# Patient Record
Sex: MALE | Race: WHITE | Employment: OTHER | URBAN - METROPOLITAN AREA
[De-identification: names, ages, dates, MRNs, and addresses within clinical notes are randomized per-mention and may not be internally consistent; named-entity substitution may affect disease eponyms.]

---

## 2018-02-19 ENCOUNTER — HOSPITAL ENCOUNTER (OUTPATIENT)
Dept: GENERAL RADIOLOGY | Age: 68
Discharge: HOME | End: 2018-02-19
Attending: FAMILY MEDICINE
Payer: MEDICARE

## 2018-02-19 ENCOUNTER — HOSPITAL ENCOUNTER (OUTPATIENT)
Dept: NON INVASIVE DIAGNOSTICS | Age: 68
Discharge: HOME | End: 2018-02-19
Attending: FAMILY MEDICINE
Payer: MEDICARE

## 2018-02-19 DIAGNOSIS — R06.00 PND (PAROXYSMAL NOCTURNAL DYSPNEA): ICD-10-CM

## 2018-02-19 DIAGNOSIS — R06.02 SHORTNESS OF BREATH: ICD-10-CM

## 2018-02-19 DIAGNOSIS — R06.00 DYSPNEA: ICD-10-CM

## 2018-02-19 PROCEDURE — 71046 X-RAY EXAM CHEST 2 VIEWS: CPT

## 2018-02-19 PROCEDURE — 93306 TTE W/DOPPLER COMPLETE: CPT

## 2018-03-14 ENCOUNTER — OFFICE VISIT (OUTPATIENT)
Dept: CARDIOLOGY CLINIC | Age: 68
End: 2018-03-14

## 2018-03-14 VITALS
DIASTOLIC BLOOD PRESSURE: 70 MMHG | BODY MASS INDEX: 28.81 KG/M2 | SYSTOLIC BLOOD PRESSURE: 138 MMHG | HEIGHT: 69 IN | HEART RATE: 72 BPM | WEIGHT: 194.5 LBS

## 2018-03-14 DIAGNOSIS — R07.9 CHEST PAIN, UNSPECIFIED TYPE: Primary | ICD-10-CM

## 2018-03-14 DIAGNOSIS — R06.02 SOB (SHORTNESS OF BREATH): ICD-10-CM

## 2018-03-14 DIAGNOSIS — I35.0 NONRHEUMATIC AORTIC VALVE STENOSIS: ICD-10-CM

## 2018-03-14 RX ORDER — FUROSEMIDE 40 MG/1
TABLET ORAL DAILY
COMMUNITY
End: 2018-05-17 | Stop reason: SDUPTHER

## 2018-03-14 NOTE — PROGRESS NOTES
HISTORY OF PRESENT ILLNESS  Daniel Cedillo is a 70 y.o. male     SUMMARY:   Problem List  Date Reviewed: 3/14/2018          Codes Class Noted    Chest pain ICD-10-CM: R07.9  ICD-9-CM: 786.50  3/14/2018        SOB (shortness of breath) ICD-10-CM: R06.02  ICD-9-CM: 786.05  3/14/2018        Nonrheumatic aortic valve stenosis ICD-10-CM: I35.0  ICD-9-CM: 424.1  3/14/2018    Overview Signed 3/14/2018 12:56 PM by Peter Bruner MD     2/18 echo normal lvef, mild as peak gradient 45mm mean 23 varun 1.3cm2. Mild ai                   No current outpatient prescriptions on file prior to visit. No current facility-administered medications on file prior to visit. CARDIOLOGY STUDIES TO DATE:  2/18 echo normal lvef, mild as peak gradient 45mm mean 23 varun 1.3cm2. Mild ai        Chief Complaint   Patient presents with    Aortic Stenosis     HPI :  Mr. Manolo Chen is a 70year-old referred by Dr. Terri Qureshi for cardiac evaluation. For years, he has slept in a recliner because he cannot lie flat and get to sleep. It is very difficult to tell from talking to him whether this is an issue with his nasal airways, mouth breathing or it is true orthopnea, but at any rate, it has gotten much worse over the last couple of months. He saw Dr. Terri Qureshi about this and we have reviewed those records. He had a normal EKG and normal lab work, except for an elevated glucose and a negative chest x-ray. He had an echocardiogram, the results of which are summarized above. He brought in a bunch of records from home and he has a finger oximeter and he has noted both low heart rate and low oxygen levels after he lies down for a while and it seems to correlate with his symptoms. He has occasional episodes when he is sitting for too long and he will get short of breath, but he really does not have any exertional symptoms.   He was recently started on Lasix and he thinks that has helped in that he can get a couple of hours of sleep a night now, even though he takes the Lasix at night and he does not have excessive urination from the Lasix nor does he have any dependent edema at the end of the day. CARDIAC ROS:   negative for chest pain, palpitations, syncope, exertional chest pressure/discomfort, claudication    Family History   Problem Relation Age of Onset    Heart Disease Mother      chf       History reviewed. No pertinent past medical history. GENERAL ROS:  A comprehensive review of systems was negative except for: Gastrointestinal: positive for dysphagia    Visit Vitals    /70    Pulse 72    Ht 5' 9\" (1.753 m)    Wt 194 lb 8 oz (88.2 kg)    BMI 28.72 kg/m2       Wt Readings from Last 3 Encounters:   03/14/18 194 lb 8 oz (88.2 kg)            BP Readings from Last 3 Encounters:   03/14/18 138/70       PHYSICAL EXAM  General appearance: alert, cooperative, no distress, appears stated age  Neurologic: Alert and oriented X 3  Neck: supple, symmetrical, trachea midline, no adenopathy, no carotid bruit and no JVD  Lungs: clear to auscultation bilaterally  Heart: regular rate and rhythm, S1, S2 normal, no S3 or S4, systolic murmur: early systolic 2/6, crescendo at 2nd right intercostal space  Abdomen: soft, non-tender. Bowel sounds normal. No masses,  no organomegaly  Extremities: extremities normal, atraumatic, no cyanosis or edema  Pulses: 2+ and symmetric      ASSESSMENT  Mr. Denny's symptoms are very intriguing. They are not related to his aortic stenosis. He is wondering about diastolic dysfunction. That is a slight possibility, but I do not think that is very likely. I wonder about some sort of positional hypoxia syndrome with intracardiac shunting and I am not sure how to factor in the bradycardia that he has recorded on his oximeter.   I do think he needs a complete pulmonary evaluation, including some sort of sleep evaluation because it sounds like he has something in that area as well, but I cannot put my finger on it.  We are going to do a Holter monitor on him to see what his heart rate does when he is having symptoms and I also think we will repeat his echocardiogram and do a bubble study to see if we can detect any shunting. He may ultimately need a CTA of the chest and great vessels to further sort this out. current treatment plan is effective, no change in therapy  lab results and schedule of future lab studies reviewed with patient  reviewed diet, exercise and weight control    Encounter Diagnoses   Name Primary?  Chest pain, unspecified type Yes    Nonrheumatic aortic valve stenosis     SOB (shortness of breath)      Orders Placed This Encounter    AMB POC EKG ROUTINE W/ 12 LEADS, INTER & REP    furosemide (LASIX) 40 mg tablet       Follow-up Disposition:  Return in about 4 weeks (around 4/11/2018).     Karime Lopez MD  3/14/2018

## 2018-03-14 NOTE — MR AVS SNAPSHOT
[address] [facility] [address] [place] 
[phone] Patient: Lorena Zee MRN: [id number] JJC:8[id number] Visit Information Date & Time Provider Department Dept. Phone Encounter #  
 3/14/2018  2:40 PM Mayelin Espinoza MD [facility]ie Metzger [phone] [id number] Follow-up Instructions Return in about 4 weeks (around 4/11/2018). Your Appointments 3/21/2018 10:30 AM  
PROCEDURE with HOLTER, [facility] ([facility] SCHEDULING) Appt Note: 48 hr holter per Dr. Gaviota Galvan, chest discomfort  
 [address] [facility] [address] [place] [address]  
[address] [facility] [place]  
  
    
 3/30/2018  2:20 PM  
ESTABLISHED PATIENT with Mayelin Esipnoza MD  
[facility] ([facility]) Appt Note: f/u per Dr. Chay Avila [address] [place]  
[address] [facility],[address] [phone] Upcoming Health Maintenance Date Due Hepatitis C Screening 1950 DTaP/Tdap/Td series (1 - Tdap) 2/20/1970 FOBT Q 1 YEAR AGE 50-75 2/20/1999 ZOSTER VACCINE AGE 60> 12/20/2008 GLAUCOMA SCREENING Q2Y 2/20/2014 Pneumococcal 65+ Low/Medium Risk (1 of 2 - PCV13) 2/20/2014 MEDICARE YEARLY EXAM 2/20/2014 Influenza Age 5 to Adult 8/1/2017 Allergies as of 3/14/2018  Review Complete On: 3/14/2018 By: Mayelin Espinoza MD  
 No Known Allergies Current Immunizations  Never Reviewed No immunizations on file. Not reviewed this visit You Were Diagnosed With   
  
 Codes Comments Chest pain, unspecified type    -  Primary ICD-10-CM: R07.9 ICD-9-CM: 786.50 Nonrheumatic aortic valve stenosis     ICD-10-CM: I35.0 ICD-9-CM: 424.1 SOB (shortness of breath)     ICD-10-CM: R06.02 
ICD-9-CM: 786.05 Vitals BP Pulse Height(growth percentile) Weight(growth percentile) BMI Smoking Status 138/70 72 5' 9\" (1.753 m) 194 lb 8 oz (88.2 kg) 28.72 kg/m2 Never Smoker Vitals History BMI and BSA Data Body Mass Index Body Surface Area 28.72 kg/m 2 2.07 m 2 Your Updated Medication List  
  
   
This list is accurate as of 3/14/18  4:05 PM.  Always use your most recent med list.  
  
  
  
  
 LASIX 40 mg tablet Generic drug:  furosemide Take  by mouth daily. We Performed the Following AMB POC EKG ROUTINE W/ 12 LEADS, INTER & REP [12427 CPT(R)] Follow-up Instructions Return in about 4 weeks (around 4/11/2018). Introducing [facility]! Mily Campbell introduces Tracked.com patient portal. Now you can access parts of your medical record, email your doctor's office, and request medication refills online. 1. In your internet browser, go to [email or web address] 2. Click on the First Time User? Click Here link in the Sign In box. You will see the New Member Sign Up page. 3. Enter your [employer]com Access Code exactly as it appears below. You will not need to use this code after youve completed the sign-up process. If you do not sign up before the expiration date, you must request a new code. · Tr[employer] Access Code: [email or web address] Expires: 5/20/2018  3:15 PM 
 
4. Enter the last four digits of your Social Security Number (xxxx) and Date of Birth (mm/dd/yyyy) as indicated and click Submit. You will be taken to the next sign-up page. 5. Create a Bet[employer]knowt ID. This will be your Tracked.com login ID and cannot be changed, so think of one that is secure and easy to remember. 6. Create a Tracked.com password. You can change your password at any time. 7. Enter your Password Reset Question and Answer. This can be used at a later time if you forget your password. 8. Enter your e-mail address.  You will receive e-mail notification when new information is available in [employer]. 9. Click Sign Up. You can now view and download portions of your medical record. 10. Click the Download Summary menu link to download a portable copy of your medical information. If you have questions, please visit the Frequently Asked Questions section of the [employer] website. Remember, [employer] is NOT to be used for urgent needs. For medical emergencies, dial 911. Now available from your iPhone and Android! Please provide this summary of care documentation to your next provider. Your primary care clinician is listed as Marcos Millard. If you have any questions after today's visit, please call [phone].

## 2018-03-20 ENCOUNTER — TELEPHONE (OUTPATIENT)
Dept: CARDIOLOGY CLINIC | Age: 68
End: 2018-03-20

## 2018-03-20 DIAGNOSIS — R06.02 SOB (SHORTNESS OF BREATH): Primary | ICD-10-CM

## 2018-03-20 NOTE — TELEPHONE ENCOUNTER
Called again. I left another message on voicemail stating I was calling to see if he might be able to come in tomorrow at 8 am for other testing. I asked he call back either way.

## 2018-03-20 NOTE — TELEPHONE ENCOUNTER
Patient needs an echo with bubble study, dx hypoxemia, per Dr. Candelaria Rivas. I went ahead and put patient on the schedule for an echo with bubbles for tomorrow at 8 am. He was scheduled to get a 48 hour holter at 10:30 am tomorrow, but Lazarus Plan will put his holter on after the echo if patient is okay with date/time tomorrow.

## 2018-03-20 NOTE — TELEPHONE ENCOUNTER
Called patient. Left message on voicemail requesting call back. I will see if patient is okay with coming in tomorrow at 8. If he is not, we can always reschedule his holter appointment to a day he can get the echo also. Or he can keep the holter appointment at 10:30 tomorrow and get the echo when he drops off the holter if there is a date/time that works.

## 2018-03-20 NOTE — TELEPHONE ENCOUNTER
Patient called back. Verified patient's identity with two identifiers. I told patient Dr. Joe Forte wants him to have another echo that gives a better picture of his heart flow. I told him I could r/s Patient is worried about possible winter weather tomorrow and wanted to r/s holter anyway. Rescheduled both appointments for Friday at 3pm. Patient will arrive at 2:40pm. Patient verbalizes understanding and denies further questions or concerns.

## 2018-03-20 NOTE — TELEPHONE ENCOUNTER
Abner Cee MD   You 1 hour ago (1:46 PM)                 Great. Please let him know what we are proposing.  thanks

## 2018-03-23 ENCOUNTER — CLINICAL SUPPORT (OUTPATIENT)
Dept: CARDIOLOGY CLINIC | Age: 68
End: 2018-03-23

## 2018-03-23 DIAGNOSIS — R07.9 CHEST PAIN, UNSPECIFIED TYPE: ICD-10-CM

## 2018-03-23 DIAGNOSIS — R06.02 SOB (SHORTNESS OF BREATH): ICD-10-CM

## 2018-03-23 DIAGNOSIS — R06.02 SOB (SHORTNESS OF BREATH): Primary | ICD-10-CM

## 2018-03-23 DIAGNOSIS — I35.0 NONRHEUMATIC AORTIC VALVE STENOSIS: ICD-10-CM

## 2018-03-23 NOTE — PROGRESS NOTES
Pt came in for bubble study per Dr. Tony Terry. IV started rt ac #22 and flushed without difficulty. Aggitated saline injected x3 from multiple views. IV removed and pressure applied until homeostasis achieved. Pt advised to leave pressure bandage on for at least 1 hour. Pt verbalized understanding and voiced no complaints.

## 2018-03-26 ENCOUNTER — HOSPITAL ENCOUNTER (OUTPATIENT)
Dept: PULMONOLOGY | Age: 68
Discharge: HOME | End: 2018-03-26
Attending: FAMILY MEDICINE
Payer: MEDICARE

## 2018-03-26 DIAGNOSIS — R06.02 SHORTNESS OF BREATH: ICD-10-CM

## 2018-03-26 PROCEDURE — 94375 RESPIRATORY FLOW VOLUME LOOP: CPT

## 2018-03-26 RX ORDER — ALBUTEROL SULFATE 0.83 MG/ML
2.5 SOLUTION RESPIRATORY (INHALATION)
Status: DISCONTINUED | OUTPATIENT
Start: 2018-03-26 | End: 2018-03-26

## 2018-03-29 ENCOUNTER — OFFICE VISIT (OUTPATIENT)
Dept: CARDIOLOGY CLINIC | Age: 68
End: 2018-03-29

## 2018-03-29 VITALS
DIASTOLIC BLOOD PRESSURE: 78 MMHG | RESPIRATION RATE: 20 BRPM | SYSTOLIC BLOOD PRESSURE: 138 MMHG | HEIGHT: 69 IN | WEIGHT: 197 LBS | HEART RATE: 68 BPM | BODY MASS INDEX: 29.18 KG/M2 | OXYGEN SATURATION: 99 %

## 2018-03-29 DIAGNOSIS — I35.0 NONRHEUMATIC AORTIC VALVE STENOSIS: ICD-10-CM

## 2018-03-29 DIAGNOSIS — R06.00 PND (PAROXYSMAL NOCTURNAL DYSPNEA): ICD-10-CM

## 2018-03-29 DIAGNOSIS — R06.02 SOB (SHORTNESS OF BREATH): Primary | ICD-10-CM

## 2018-03-29 NOTE — PROGRESS NOTES
HISTORY OF PRESENT ILLNESS  Lorena Zee is a 70 y.o. male     SUMMARY:   Problem List  Date Reviewed: 3/14/2018          Codes Class Noted    Chest pain ICD-10-CM: R07.9  ICD-9-CM: 786.50  3/14/2018        SOB (shortness of breath) ICD-10-CM: R06.02  ICD-9-CM: 786.05  3/14/2018        Nonrheumatic aortic valve stenosis ICD-10-CM: I35.0  ICD-9-CM: 424.1  3/14/2018    Overview Signed 3/14/2018 12:56 PM by Mayelin Espinoza MD     2/18 echo normal lvef, mild as peak gradient 45mm mean 23 varun 1.3cm2. Mild ai                   Current Outpatient Prescriptions on File Prior to Visit   Medication Sig    furosemide (LASIX) 40 mg tablet Take  by mouth daily. No current facility-administered medications on file prior to visit. CARDIOLOGY STUDIES TO DATE:  3/18 holter hr  bpm. occassional pvcs and pacs, episodes sob correlate with sinus rhythm  2/18 echo normal lvef, mild as peak gradient 45mm mean 23 varun 1.3cm2. Mild ai  3/18 limited echo with pos bubble study small amount right to left shunting with cough consistent with pfo      Chief Complaint   Patient presents with    Shortness of Breath     HPI :  Mr. Maci Grijalva has had PFTs since we last met, which were normal by his report and he has an appointment with pulmonary sleep doctor next week. He brought in more monitoring results from home and during the time he had the Holter on, he did have some oxygen saturations, which dropped down into the high to mid 80s when he was reclined at 45 degrees on his right or left side. At other times during these positions, his saturations would be normal.  He has noticed that at times when he has felt short of breath, his saturations have been normal.  He is still taking 20 - 40 mg a day of Lasix, which seems to help with his very unorthodox sleep cycle. He is still complaining of a lot of sinus issues.   We went over his Holter report, which clearly shows that he does not have bradycardia that would benefit from any sort of pacemaker and we reviewed his bubble study, which showed a tiny PFO that was noted after cough. CARDIAC ROS:   negative for chest pain, palpitations, syncope, orthopnea, exertional chest pressure/discomfort, claudication, lower extremity edema    Family History   Problem Relation Age of Onset    Heart Disease Mother      chf       History reviewed. No pertinent past medical history. GENERAL ROS:  A comprehensive review of systems was negative except for that written in the HPI. Visit Vitals    /78    Pulse 68    Resp 20    Ht 5' 9\" (1.753 m)    Wt 197 lb (89.4 kg)    SpO2 99%    BMI 29.09 kg/m2       Wt Readings from Last 3 Encounters:   03/29/18 197 lb (89.4 kg)   03/14/18 194 lb 8 oz (88.2 kg)            BP Readings from Last 3 Encounters:   03/29/18 138/78   03/14/18 138/70       PHYSICAL EXAM  General appearance: alert, cooperative, no distress, appears stated age  Neck: supple, symmetrical, trachea midline, no adenopathy, no carotid bruit and no JVD  Lungs: clear to auscultation bilaterally  Heart: regular rate and rhythm, S1, S2 normal, no S3 or S4, systolic murmur: early systolic 2/6, crescendo at 2nd right intercostal space  Extremities: extremities normal, atraumatic, no cyanosis or edema      ASSESSMENT  It is still a puzzle why he has these positional problems and the more I talk to him, the more it seems that this has actually been going on for quite some time, though much worse in the last few months. I do not think his aortic stenosis would cause these type of symptoms and I do not think his tiny PFO would cause these kind of symptoms, but I am wondering if he does have some other kind of right to left shunt and so perhaps a CTA or an MRI could help sort that out, so I am going to talk to Dr. Leticia Mcnally about his suitability for MRI. He is going to follow through with his sleep pulmonary evaluation and we will go from there.           current treatment plan is effective, no change in therapy  lab results and schedule of future lab studies reviewed with patient  reviewed diet, exercise and weight control    Encounter Diagnoses   Name Primary?  SOB (shortness of breath) Yes    Nonrheumatic aortic valve stenosis     PND (paroxysmal nocturnal dyspnea)      No orders of the defined types were placed in this encounter. Follow-up Disposition:  Return in about 4 weeks (around 4/26/2018).     Mayelin Espinoza MD  3/29/2018

## 2018-03-29 NOTE — MR AVS SNAPSHOT
[address] [facility] [address] [facility] 
[phone] Patient: Michael Villalobos MRN: [id number] ODW:0/68/5256 Visit Information Date & Time Provider Department Dept. Phone Encounter #  
 3/29/2018  2:40 PM Brian Camacho MD [facility] [place] [phone] [id number] Follow-up Instructions Return in about 4 weeks (around 4/26/2018). Your Appointments 4/2/2018  8:40 AM  
New Patient with Lowell Beckham MD  
[address] ([facility]) Appt Note: NP; ref Dr Yanelis Mercado; not able to sleep; noct hypoxemia; when recline to sleep, lungs fills upMedicare; NP; ref Dr Yanelis Mercado; not able to sleep; noct hypoxemia; when recline to sleep, lungs fills upMedicare; No PA required; .  
 [address] [place] [phone] [address] [facility] [phone] Upcoming Health Maintenance Date Due Hepatitis C Screening 1950 DTaP/Tdap/Td series (1 - Tdap) 2/20/1970 FOBT Q 1 YEAR AGE 50-75 2/20/1999 ZOSTER VACCINE AGE 60> 12/20/2008 GLAUCOMA SCREENING Q2Y 2/20/2014 Pneumococcal 65+ Low/Medium Risk (1 of 2 - PCV13) 2/20/2014 Influenza Age 5 to Adult 8/1/2017 MEDICARE YEARLY EXAM 3/14/2018 Allergies as of 3/29/2018  Review Complete On: 3/29/2018 By: Brian Camacho MD  
 No Known Allergies Current Immunizations  Never Reviewed No immunizations on file. Not reviewed this visit You Were Diagnosed With   
  
 Codes Comments SOB (shortness of breath)    -  Primary ICD-10-CM: R06.02 
ICD-9-CM: 786.05 Nonrheumatic aortic valve stenosis     ICD-10-CM: I35.0 ICD-9-CM: 424.1 PND (paroxysmal nocturnal dyspnea)     ICD-10-CM: R06.00 
ICD-9-CM: 786.09 Vitals BP Pulse Resp Height(growth percentile) Weight(growth percentile) SpO2 138/78 68 20 5' 9\" (1.753 m) 197 lb (89.4 kg) 99% BMI Smoking Status 29.09 kg/m2 Never Smoker BMI and BSA Data Body Mass Index Body Surface Area  
 29.09 kg/m 2 2.09 m 2 Preferred Pharmacy Pharmacy Name Phone [address], [address] [facility] [address] [phone] Your Updated Medication List  
  
   
This list is accurate as of 3/29/18  3:17 PM.  Always use your most recent med list.  
  
  
  
  
 LASIX 40 mg tablet Generic drug:  furosemide Take  by mouth daily. Follow-up Instructions Return in about 4 weeks (around 4/26/2018). Introducing \[facility]\"" & [facility]! Ashok Harper introduces [employer] patient portal. Now you can access parts of your medical record, email your doctor's office, and request medication refills online. 1. In your internet browser, go to [email or web address] 2. Click on the First Time User? Click Here link in the Sign In box. You will see the New Member Sign Up page. 3. Enter your or[employer]rans Access Code exactly as it appears below. You will not need to use this code after youve completed the sign-up process. If you do not sign up before the expiration date, you must request a new code. · [employer] Access Code: [email or web address] Expires: 5/20/2018  3:15 PM 
 
4. Enter the last four digits of your Social Security Number (xxxx) and Date of Birth (mm/dd/yyyy) as indicated and click Submit. You will be taken to the next sign-up page. 5. Create a [employer] ID. This will be your or[employer]rans login ID and cannot be changed, so think of one that is secure and easy to remember. 6. Create a or[employer]utrans password. You can change your password at any time. 7. Enter your Password Reset Question and Answer. This can be used at a later time if you forget your password. 8. Enter your e-mail address. You will receive e-mail notification when new information is available in [address] 9. Click Sign Up. You can now view and download portions of your medical record. 10. Click the Download Summary menu link to download a portable copy of your medical information. If you have questions, please visit the Frequently Asked Questions section of the [employer] website. Remember, [employer] is NOT to be used for urgent needs. For medical emergencies, dial 911. Now available from your iPhone and Android! Please provide this summary of care documentation to your next provider. Your primary care clinician is listed as Cora Manual. If you have any questions after today's visit, please call [phone].

## 2018-03-30 DIAGNOSIS — I35.0 NONRHEUMATIC AORTIC VALVE STENOSIS: ICD-10-CM

## 2018-03-30 DIAGNOSIS — R06.02 SOB (SHORTNESS OF BREATH): Primary | ICD-10-CM

## 2018-03-30 NOTE — PROCEDURES
[address]  MR#: [id number]  : 1950  ACCOUNT #: [de-identified]   DATE OF SERVICE: 2018    I have reviewed spirometry for the patient. Spirometry does not reveal any evidence of obstructive lung disease. Flow volume loop is acceptable. No evidence of obstructive lung disease. Catrina Cowden Almon Gaucher, MD Carilyn Score / [place]  D: 2018 15:04     T: 2018 02:37  JOB #: [id number]

## 2018-04-02 ENCOUNTER — OFFICE VISIT (OUTPATIENT)
Dept: SLEEP MEDICINE | Age: 68
End: 2018-04-02

## 2018-04-02 VITALS
BODY MASS INDEX: 29.18 KG/M2 | OXYGEN SATURATION: 97 % | HEART RATE: 65 BPM | HEIGHT: 69 IN | SYSTOLIC BLOOD PRESSURE: 129 MMHG | WEIGHT: 197 LBS | DIASTOLIC BLOOD PRESSURE: 64 MMHG

## 2018-04-02 DIAGNOSIS — R06.02 SOB (SHORTNESS OF BREATH): ICD-10-CM

## 2018-04-02 DIAGNOSIS — G47.33 OSA (OBSTRUCTIVE SLEEP APNEA): Primary | ICD-10-CM

## 2018-04-02 DIAGNOSIS — G47.34 NOCTURNAL HYPOXEMIA: ICD-10-CM

## 2018-04-02 RX ORDER — MELATONIN 5 MG
5 CAPSULE ORAL
COMMUNITY

## 2018-04-02 NOTE — PROGRESS NOTES
217 [facility] [place]. [place], [address]  Tel.  [phone]  Fax. 100 [facility] 60  [place], [address] [facility]  Tel.  [phone]  Fax. [phone] [address]   Tel.  [phone]  Fax. [phone]         Subjective:      Lawrence Mcburney is an 70 y.o. male referred for evaluation for a sleep disorder. He complains of shortness of breath on laying down in bed without lasix. Has been noted to have low oxygen level with or without lasix which he has been taking in the past month. Once a sleep he does tend to wake up gasping for air. He has nasal obstruction uses nasal saline solution. He has been sleeping in a recliner or raised bed in the past 12 years. Symptoms began 3 months ago, unchanged since that time. He usually can fall asleep in 15 minutes after taking Lasix and reading in a raised bed. He sleeps by himself. He denies completely or partially paralyzed while falling asleep or waking up. Lawrence Mcburney does wake up frequently at night. He is bothered by waking up too early and left unable to get back to sleep. He actually sleeps about 3-4 hours at night and wakes up about 3 times during the night. He does not work shifts:  .   Sangeetha Floyd indicates he does get too little sleep at night. His bedtime is 2330. He awakens at 0500. He does not take naps. He has the following observed behaviors:  ;  .  Other remarks: waking with gasp, shortness of breath, suffocating - continuiously difficult    Haddock Sleepiness Score: 11 which reflect moderate daytime drowsiness. No Known Allergies      Current Outpatient Prescriptions:     melatonin 5 mg cap capsule, Take 5 mg by mouth nightly., Disp: , Rfl:     furosemide (LASIX) 40 mg tablet, Take  by mouth daily. , Disp: , Rfl:      He  has a past medical history of Diabetes ([facility] [address] Dyspnea; and Migraines. He  has a past surgical history that includes hx tonsillectomy.     He family history includes Heart Disease in his mother. He  reports that he has never smoked. He has never used smokeless tobacco. He reports that he does not drink alcohol or use illicit drugs. Review of Systems:  Constitutional:  No significant weight loss or weight gain  Eyes:  No blurred vision. CVS:  significant chest pain attributed to deep breathing on waking  Pulm: significant shortness of breath  GI:  No significant nausea or vomiting  :  significant nocturia  Musculoskeletal:  No significant joint pain at night  Skin:  No significant rashes  Neuro:  No significant dizziness   Psych:  No active mood issues    Sleep Review of Systems: notable for no difficulty falling asleep; frequent awakenings at night;  regular dreaming noted; no nightmares ; no early morning headaches; no memory problems; no concentration issues; no history of any automobile or occupational accidents due to daytime drowsiness. Objective:     Visit Vitals    /64    Pulse 65    Ht 5' 9\" (1.753 m)    Wt 197 lb (89.4 kg)    SpO2 97%    BMI 29.09 kg/m2         General:   Not in acute distress   Eyes:  Anicteric sclerae, no obvious strabismus   Nose:  No obvious nasal septum deviation    Oropharynx:   Class 4 oropharyngeal outlet, thick tongue base, uvula could not be seen due to low-lying soft palate, narrow tonsilo-pharyngeal pilars   Tonsils:   tonsils are not seen due to low-lying soft palate   Neck:   Neck circ. in \"inches\": 15; midline trachea   Chest/Lungs:  Equal lung expansion, clear on auscultation    CVS:  Normal rate, regular rhythm; no JVD   Skin:  Warm to touch; no obvious rashes   Neuro:  No focal deficits ; no obvious tremor    Psych:  Normal affect,  normal countenance;          Assessment:       ICD-10-CM ICD-9-CM    1. RIA (obstructive sleep apnea) G47.33 327.23 POLYSOMNOGRAPHY 1 NIGHT   2. SOB (shortness of breath) R06.02 786.05    3. Nocturnal hypoxemia G47.34 327.24 POLYSOMNOGRAPHY 1 NIGHT   4.  BMI 28.0-28.9,adult Z68.28 V85.24          Plan:     * The patient currently has a Low Risk for having sleep apnea. STOP-BANG score 3.  * Sleep testing was ordered for initial evaluation. * He was provided information on sleep apnea including coresponding risk factors and the importance of proper treatment. * Treatment options if indicated were reviewed today. Patient agrees to a trial of PAP therapy if indicated. * Counseling was provided regarding proper sleep hygiene (including effect of light on sleep) and safe driving. * Effect of sleep disturbance on weight was reviewed. We have recommended a dedicated weight loss through appropriate diet and an exercise regiment as significant weight reduction has been shown to reduce severity of obstructive sleep apnea. * Patient agrees to telephone [phone]  follow-up by myself or lead sleep technologist shortly after sleep study to review results and plan final management.     (patient has given permission for a message to be left regarding test results and further management if patient cannot be cannot be reached directly). Thank you for allowing us to participate in your patient's medical care. We'll keep you updated on these investigations. Destini Romero MD, FAASM  Electronically signed.  04/02/18

## 2018-04-02 NOTE — MR AVS SNAPSHOT
[address] [place], [place] [address] [facility] [address] [id number] [phone] Patient: Crystal Johns MRN: [id number] XFZ:[birth year] Visit Information Date & Time Provider Department Dept. Phone Encounter #  
 4/2/2018  8:40 AM Florentino Mane MD [id number] Follow-up Instructions Return if symptoms worsen or fail to improve. Your Appointments 4/27/2018  2:20 PM  
ESTABLISHED PATIENT with Juan Reddy MD  
[facility] ([address] [facility]) Appt Note: 1 mo f/u per Dr. Kimberly Lyons [address] Mekhi Mcdaniels 13  
[address] [place] [address] Upcoming Health Maintenance Date Due Hepatitis C Screening 1950 DTaP/Tdap/Td series (1 - Tdap) 2/20/1970 FOBT Q 1 YEAR AGE 50-75 2/20/1999 ZOSTER VACCINE AGE 60> 12/20/2008 GLAUCOMA SCREENING Q2Y 2/20/2014 Pneumococcal 65+ Low/Medium Risk (1 of 2 - PCV13) 2/20/2014 Influenza Age 5 to Adult 8/1/2017 MEDICARE YEARLY EXAM 3/14/2018 Allergies as of 4/2/2018  Review Complete On: 4/2/2018 By: Florentino Mane MD  
 No Known Allergies Current Immunizations  Never Reviewed No immunizations on file. Not reviewed this visit You Were Diagnosed With   
  
 Codes Comments RIA (obstructive sleep apnea)    -  Primary ICD-10-CM: G47.33 
ICD-9-CM: 327.23   
 SOB (shortness of breath)     ICD-10-CM: R06.02 
ICD-9-CM: 786.05 Nocturnal hypoxemia     ICD-10-CM: G47.34 
ICD-9-CM: 327.24 BMI 28.0-28.9,adult     ICD-10-CM: D70.09 
ICD-9-CM: V85.24 Vitals BP Pulse Height(growth percentile) Weight(growth percentile) SpO2 BMI  
 129/64 65 5' 9\" (1.753 m) 197 lb (89.4 kg) 97% 29.09 kg/m2 Smoking Status Never Smoker BMI and BSA Data Body Mass Index Body Surface Area 29.09 kg/m 2 2.09 m 2 Preferred Pharmacy Pharmacy Name Phone 500 [facility] [address], [address] [phone] Your Updated Medication List  
  
   
This list is accurate as of 4/2/18  9:25 AM.  Always use your most recent med list.  
  
  
  
  
 LASIX 40 mg tablet Generic drug:  furosemide Take  by mouth daily. melatonin 5 mg Cap capsule Take 5 mg by mouth nightly. Follow-up Instructions Return if symptoms worsen or fail to improve. To-Do List   
 04/02/2018 Sleep Center:  POLYSOMNOGRAPHY 1 NIGHT Patient Instructions [address] [place] [facility], [address] Tel.  [phone] Fax. 100 [facility] 60 [place], [address] Tel.  [phone] Fax. [phone] [address] Tel.  [phone] Fax. [phone] Sleep Apnea: After Your Visit Your Care Instructions Sleep apnea occurs when you frequently stop breathing for 10 seconds or longer during sleep. It can be mild to severe, based on the number of times per hour that you stop breathing or have slowed breathing. Blocked or narrowed airways in your nose, mouth, or throat can cause sleep apnea. Your airway can become blocked when your throat muscles and tongue relax during sleep. Sleep apnea is common, occurring in 1 out of 20 individuals. Individuals having any of the following characteristics should be evaluated and treated right away due to high risk and detrimental consequences from untreated sleep apnea: 
1. Obesity 2. Congestive Heart failure 3. Atrial Fibrillation 4. Uncontrolled Hypertension 5. Type II Diabetes 6. Night-time Arrhythmias 7. Stroke 8. Pulmonary Hypertension 9. High-risk Driving Populations (pilots, truck drivers, etc.) 10. Patients Considering Weight-loss Surgery How do you know you have sleep apnea?   You probably have sleep apnea if you answer 'yes' to 3 or more of the following questions: S - Have you been told that you Snore? T - Are you often Tired during the day? O - Has anyone Observed you stop breathing while sleeping? P- Do you have (or are being treated for) high blood Pressure? B - Are you obese (Body Mass Index > 35)? A - Is your Age 48years old or older? N - Is your Neck size greater than 16 inches? G - Are you male Gender? A sleep physician can prescribe a breathing device that prevents tissues in the throat from blocking your airway. Or your doctor may recommend using a dental device (oral breathing device) to help keep your airway open. In some cases, surgery may be needed to remove enlarged tissues in the throat. Follow-up care is a key part of your treatment and safety. Be sure to make and go to all appointments, and call your doctor if you are having problems. It's also a good idea to know your test results and keep a list of the medicines you take. How can you care for yourself at home? · Lose weight, if needed. It may reduce the number of times you stop breathing or have slowed breathing. · Go to bed at the same time every night. · Sleep on your side. It may stop mild apnea. If you tend to roll onto your back, sew a pocket in the back of your pajama top. Put a tennis ball into the pocket, and stitch the pocket shut. This will help keep you from sleeping on your back. · Avoid alcohol and medicines such as sleeping pills and sedatives before bed. · Do not smoke. Smoking can make sleep apnea worse. If you need help quitting, talk to your doctor about stop-smoking programs and medicines. These can increase your chances of quitting for good. · Prop up the head of your bed 4 to 6 inches by putting bricks under the legs of the bed. · Treat breathing problems, such as a stuffy nose, caused by a cold or allergies.  
· Use a continuous positive airway pressure (CPAP) breathing machine if lifestyle changes do not help your apnea and your doctor recommends it. The machine keeps your airway from closing when you sleep. · If CPAP does not help you, ask your doctor whether you should try other breathing machines. A bilevel positive airway pressure machine has two types of air pressureâone for breathing in and one for breathing out. Another device raises or lowers air pressure as needed while you breathe. · If your nose feels dry or bleeds when using one of these machines, talk with your doctor about increasing moisture in the air. A humidifier may help. · If your nose is runny or stuffy from using a breathing machine, talk with your doctor about using decongestants or a corticosteroid nasal spray. When should you call for help? Watch closely for changes in your health, and be sure to contact your doctor if: 
· You still have sleep apnea even though you have made lifestyle changes. · You are thinking of trying a device such as CPAP. · You are having problems using a CPAP or similar machine. Where can you learn more? Go to GrouPAYbe. Enter M854 in the search box to learn more about \"Sleep Apnea: After Your Visit. \"  
© 2687-0092 Healthwise, Incorporated. Care instructions adapted under license by Janel Zhou (which disclaims liability or warranty for this information). This care instruction is for use with your licensed healthcare professional. If you have questions about a medical condition or this instruction, always ask your healthcare professional. Penelope Pack any warranty or liability for your use of this information. PROPER SLEEP HYGIENE What to avoid · Do not have drinks with caffeine, such as coffee or black tea, for 8 hours before bed. · Do not smoke or use other types of tobacco near bedtime. Nicotine is a stimulant and can keep you awake.  
· Avoid drinking alcohol late in the evening, because it can cause you to wake in the middle of the night. · Do not eat a big meal close to bedtime. If you are hungry, eat a light snack. · Do not drink a lot of water close to bedtime, because the need to urinate may wake you up during the night. · Do not read or watch TV in bed. Use the bed only for sleeping and sexual activity. What to try · Go to bed at the same time every night, and wake up at the same time every morning. Do not take naps during the day. · Keep your bedroom quiet, dark, and cool. · Get regular exercise, but not within 3 to 4 hours of your bedtime. [place] · Sleep on a comfortable pillow and mattress. · If watching the clock makes you anxious, turn it facing away from you so you cannot see the time. · If you worry when you lie down, start a worry book. Well before bedtime, write down your worries, and then set the book and your concerns aside. · Try meditation or other relaxation techniques before you go to bed. · If you cannot fall asleep, get up and go to another room until you feel sleepy. Do something relaxing. Repeat your bedtime routine before you go to bed again. · Make your house quiet and calm about an hour before bedtime. Turn down the lights, turn off the TV, log off the computer, and turn down the volume on music. This can help you relax after a busy day. Drowsy Driving The In[employer] cites drowsiness as a causing factor in more than 488,050 police reported crashes annually, resulting in 76,000 injuries and 1,500 deaths. Other surveys suggest 55% of people polled have driven while drowsy in the past year, 23% had fallen asleep but not crashed, 3% crashed, and 2% had and accident due to drowsy driving. Who is at risk? Young Drivers: One study of drowsy driving accidents states that 55% of the drivers were under 25 years. Of those, 75% were male.   
Shift Workers and Travelers: People who work overnight or travel across time zones frequently are at higher risk of experiencing Circadian Rhythm Disorders. They are trying to work and function when their body is programed to sleep. Sleep Deprived: Lack of sleep has a serious impact on your ability to pay attention or focus on a task. Consistently getting less than the average of 8 hours your body needs creates partial or cumulative sleep deprivation. Untreated Sleep Disorders: Sleep Apnea, Narcolepsy, R.L.S., and other sleep disorders (untreated) prevent a person from getting enough restful sleep. This leads to excessive daytime sleepiness and increases the risk for drowsy driving accidents by up to 7 times. Medications / Alcohol: Even over the counter medications can cause drowsiness. Medications that impair a drivers attention should have a warning label. Alcohol naturally makes you sleepy and on its own can cause accidents. Combined with excessive drowsiness its effects are amplified. Signs of Drowsy Driving: * You don't remember driving the last few miles * You may drift out of your alice * You are unable to focus and your thoughts wander * You may yawn more often than normal 
 * You have difficulty keeping your eyes open / nodding off * Missing traffic signs, speeding, or tailgating Prevention-  
Good sleep hygiene, lifestyle and behavioral choices have the most impact on drowsy driving. There is no substitute for sleep and the average person requires 8 hours nightly. If you find yourself driving drowsy, stop and sleep. Consider the sleep hygiene tips provided during your visit as well. Medication Refill Policy: Refills for all medications require 1 week advance notice. Please have your pharmacy fax a refill request. We are unable to fax, or call in \"controled substance\" medications and you will need to pick these prescriptions up from our office. [employer] Activation Thank you for requesting access to [employer].  Please follow the instructions below to securely access and download your online medical record. [employer] allows you to send messages to your doctor, view your test results, renew your prescriptions, schedule appointments, and more. How Do I Sign Up? 1. In your internet browser, go to [email or web address]. 2. Click on the First Time User? Click Here link in the Sign In box. You will see the New Member Sign Up page. 3. Enter your R[employer]Point Access Code exactly as it appears below. You will not need to use this code after youve completed the sign-up process. If you do not sign up before the expiration date, you must request a new code. [employer] Access Code: [email or web address] Expires: 2018  3:15 PM (This is the date your [employer]Point access code will ) 4. Enter the last four digits of your Social Security Number (xxxx) and Date of Birth (mm/dd/yyyy) as indicated and click Submit. You will be taken to the next sign-up page. 5. Create a R[employer]Point ID. This will be your R[employer]Point login ID and cannot be changed, so think of one that is secure and easy to remember. 6. Create a [employer]Point password. You can change your password at any time. 7. Enter your Password Reset Question and Answer. This can be used at a later time if you forget your password. 8. Enter your e-mail address. You will receive e-mail notification when new information is available in [address] 9. Click Sign Up. You can now view and download portions of your medical record. 10. Click the Download Summary menu link to download a portable copy of your medical information. Additional Information If you have questions, please call [phone]. Remember, [employer] is NOT to be used for urgent needs. For medical emergencies, dial 911. Introducing [facility]!    
 Jnael Zhou introduces [employer]Point patient portal. Now you can access parts of your medical record, email your doctor's office, and request medication refills online. 1. In your internet browser, go to [email or web address] 2. Click on the First Time User? Click Here link in the Sign In box. You will see the New Member Sign Up page. 3. Enter your [employer] Access Code exactly as it appears below. You will not need to use this code after youve completed the sign-up process. If you do not sign up before the expiration date, you must request a new code. · [employer] Access Code: [email or web address] Expires: 5/20/2018  3:15 PM 
 
4. Enter the last four digits of your Social Security Number (xxxx) and Date of Birth (mm/dd/yyyy) as indicated and click Submit. You will be taken to the next sign-up page. 5. Create a [employer]is[employer] ID. This will be your [employer] login ID and cannot be changed, so think of one that is secure and easy to remember. 6. Create a [employer] password. You can change your password at any time. 7. Enter your Password Reset Question and Answer. This can be used at a later time if you forget your password. 8. Enter your e-mail address. You will receive e-mail notification when new information is available in [address] 9. Click Sign Up. You can now view and download portions of your medical record. 10. Click the Download Summary menu link to download a portable copy of your medical information. If you have questions, please visit the Frequently Asked Questions section of the [employer] website. Remember, [employer] is NOT to be used for urgent needs. For medical emergencies, dial 911. Now available from your iPhone and Android! Please provide this summary of care documentation to your next provider. Your primary care clinician is listed as Helga Rand. If you have any questions after today's visit, please call [phone].

## 2018-04-02 NOTE — PATIENT INSTRUCTIONS
217 [facility] [place], [address]  Tel.  [phone]  Fax. 100 [facility] 60  [place], [address]  Tel.  [phone]  Fax. [phone] [address]  [address]  Tel.  [phone]  Fax. [phone]     Sleep Apnea: After Your Visit  Your Care Instructions  Sleep apnea occurs when you frequently stop breathing for 10 seconds or longer during sleep. It can be mild to severe, based on the number of times per hour that you stop breathing or have slowed breathing. Blocked or narrowed airways in your nose, mouth, or throat can cause sleep apnea. Your airway can become blocked when your throat muscles and tongue relax during sleep. Sleep apnea is common, occurring in 1 out of 20 individuals. Individuals having any of the following characteristics should be evaluated and treated right away due to high risk and detrimental consequences from untreated sleep apnea:  1. Obesity  2. Congestive Heart failure  3. Atrial Fibrillation  4. Uncontrolled Hypertension  5. Type II Diabetes  6. Night-time Arrhythmias  7. Stroke  8. Pulmonary Hypertension  9. High-risk Driving Populations (pilots, truck drivers, etc.)  10. Patients Considering Weight-loss Surgery    How do you know you have sleep apnea? You probably have sleep apnea if you answer 'yes' to 3 or more of the following questions:  S - Have you been told that you Snore? T - Are you often Tired during the day? O - Has anyone Observed you stop breathing while sleeping? P- Do you have (or are being treated for) high blood Pressure? B - Are you obese (Body Mass Index > 35)? A - Is your Age 48years old or older? N - Is your Neck size greater than 16 inches? G - Are you male Gender? A sleep physician can prescribe a breathing device that prevents tissues in the throat from blocking your airway.  Or your doctor may recommend using a dental device (oral breathing device) to help keep your airway open. In some cases, surgery may be needed to remove enlarged tissues in the throat. Follow-up care is a key part of your treatment and safety. Be sure to make and go to all appointments, and call your doctor if you are having problems. It's also a good idea to know your test results and keep a list of the medicines you take. How can you care for yourself at home? · Lose weight, if needed. It may reduce the number of times you stop breathing or have slowed breathing. · Go to bed at the same time every night. · Sleep on your side. It may stop mild apnea. If you tend to roll onto your back, sew a pocket in the back of your pajama top. Put a tennis ball into the pocket, and stitch the pocket shut. This will help keep you from sleeping on your back. · Avoid alcohol and medicines such as sleeping pills and sedatives before bed. · Do not smoke. Smoking can make sleep apnea worse. If you need help quitting, talk to your doctor about stop-smoking programs and medicines. These can increase your chances of quitting for good. · Prop up the head of your bed 4 to 6 inches by putting bricks under the legs of the bed. · Treat breathing problems, such as a stuffy nose, caused by a cold or allergies. · Use a continuous positive airway pressure (CPAP) breathing machine if lifestyle changes do not help your apnea and your doctor recommends it. The machine keeps your airway from closing when you sleep. · If CPAP does not help you, ask your doctor whether you should try other breathing machines. A bilevel positive airway pressure machine has two types of air pressureâone for breathing in and one for breathing out. Another device raises or lowers air pressure as needed while you breathe. · If your nose feels dry or bleeds when using one of these machines, talk with your doctor about increasing moisture in the air. A humidifier may help.   · If your nose is runny or stuffy from using a breathing machine, talk with your doctor about using decongestants or a corticosteroid nasal spray. When should you call for help? Watch closely for changes in your health, and be sure to contact your doctor if:  · You still have sleep apnea even though you have made lifestyle changes. · You are thinking of trying a device such as CPAP. · You are having problems using a CPAP or similar machine. Where can you learn more? Go to [employer]. Enter Z384 in the search box to learn more about \"Sleep Apnea: After Your Visit. \"   © 6613-5621 Healthwise, [employer]. Care instructions adapted under license by [employer] (which disclaims liability or warranty for this information). This care instruction is for use with your licensed healthcare professional. If you have questions about a medical condition or this instruction, always ask your healthcare professional. Noble Vázquez any warranty or liability for your use of this information. PROPER SLEEP HYGIENE    What to avoid  · Do not have drinks with caffeine, such as coffee or black tea, for 8 hours before bed. · Do not smoke or use other types of tobacco near bedtime. Nicotine is a stimulant and can keep you awake. · Avoid drinking alcohol late in the evening, because it can cause you to wake in the middle of the night. · Do not eat a big meal close to bedtime. If you are hungry, eat a light snack. · Do not drink a lot of water close to bedtime, because the need to urinate may wake you up during the night. · Do not read or watch TV in bed. Use the bed only for sleeping and sexual activity. What to try  · Go to bed at the same time every night, and wake up at the same time every morning. Do not take naps during the day. · Keep your bedroom quiet, dark, and cool. · Get regular exercise, but not within 3 to 4 hours of your bedtime. .  · Sleep on a comfortable pillow and mattress.   · If watching the clock makes you anxious, turn it facing away from you so you cannot see the time. · If you worry when you lie down, start a worry book. Well before bedtime, write down your worries, and then set the book and your concerns aside. · Try meditation or other relaxation techniques before you go to bed. · If you cannot fall asleep, get up and go to another room until you feel sleepy. Do something relaxing. Repeat your bedtime routine before you go to bed again. · Make your house quiet and calm about an hour before bedtime. Turn down the lights, turn off the TV, log off the computer, and turn down the volume on music. This can help you relax after a busy day. Drowsy Driving  The [address] [place] [address] Road Traffic Safety Administration cites drowsiness as a causing factor in more than 148,515 police reported crashes annually, resulting in 76,000 injuries and 1,500 deaths. Other surveys suggest 55% of people polled have driven while drowsy in the past year, 23% had fallen asleep but not crashed, 3% crashed, and 2% had and accident due to drowsy driving. Who is at risk? Young Drivers: One study of drowsy driving accidents states that 55% of the drivers were under 25 years. Of those, 75% were male. Shift Workers and Travelers: People who work overnight or travel across time zones frequently are at higher risk of experiencing Circadian Rhythm Disorders. They are trying to work and function when their body is programed to sleep. Sleep Deprived: Lack of sleep has a serious impact on your ability to pay attention or focus on a task. Consistently getting less than the average of 8 hours your body needs creates partial or cumulative sleep deprivation. Untreated Sleep Disorders: Sleep Apnea, Narcolepsy, R.L.S., and other sleep disorders (untreated) prevent a person from getting enough restful sleep. This leads to excessive daytime sleepiness and increases the risk for drowsy driving accidents by up to 7 times.   Medications / Alcohol: Even over the counter medications can cause drowsiness. Medications that impair a drivers attention should have a warning label. Alcohol naturally makes you sleepy and on its own can cause accidents. Combined with excessive drowsiness its effects are amplified. Signs of Drowsy Driving:   * You don't remember driving the last few miles   * You may drift out of your alice   * You are unable to focus and your thoughts wander   * You may yawn more often than normal   * You have difficulty keeping your eyes open / nodding off   * Missing traffic signs, speeding, or tailgating  Prevention-   Good sleep hygiene, lifestyle and behavioral choices have the most impact on drowsy driving. There is no substitute for sleep and the average person requires 8 hours nightly. If you find yourself driving drowsy, stop and sleep. Consider the sleep hygiene tips provided during your visit as well. Medication Refill Policy: Refills for all medications require 1 week advance notice. Please have your pharmacy fax a refill request. We are unable to fax, or call in \"controled substance\" medications and you will need to pick these prescriptions up from our office. [facility] Activation    Thank you for requesting access to [facility]. Please follow the instructions below to securely access and download your online medical record. [facility] allows you to send messages to your doctor, view your test results, renew your prescriptions, schedule appointments, and more. How Do I Sign Up? 1. In your internet browser, go to [email or web address] "[email or web address]. 2. Click on the First Time User? Click Here link in the Sign In box. You will see the New Member Sign Up page. 3. Enter your [facility] Access Code exactly as it appears below. You will not need to use this code after youve completed the sign-up process. If you do not sign up before the expiration date, you must request a new code.     [facility] Access Code: [email or web address]  Expires: 5/20/2018  3:15 PM (This is the date your [employer] access code will )    4. Enter the last four digits of your Social Security Number (xxxx) and Date of Birth (mm/dd/yyyy) as indicated and click Submit. You will be taken to the next sign-up page. 5. Create a [employer] ID. This will be your [employer] login ID and cannot be changed, so think of one that is secure and easy to remember. 6. Create a [employer] password. You can change your password at any time. 7. Enter your Password Reset Question and Answer. This can be used at a later time if you forget your password. 8. Enter your e-mail address. You will receive e-mail notification when new information is available in [address] 9. Click Sign Up. You can now view and download portions of your medical record. 10. Click the Download Summary menu link to download a portable copy of your medical information. Additional Information    If you have questions, please call [phone]. Remember, [employer] is NOT to be used for urgent needs. For medical emergencies, dial 911.

## 2018-04-04 ENCOUNTER — TELEPHONE (OUTPATIENT)
Dept: SLEEP MEDICINE | Age: 68
End: 2018-04-04

## 2018-04-04 NOTE — TELEPHONE ENCOUNTER
Spoke with patient who stated that he has started using oxygen and made an appointment with an allergist.  He was informed that even though he was on oxygen yet blood oxygenation may be compromised if there was airflow obstruction due to presence of Obstructive Sleep Apnea. For this reason a sleep test to evaluate for presence of sleep apnea would be strongly recommended. Patient indicated that he would give this some more thought and call back to schedule testing.

## 2018-04-04 NOTE — TELEPHONE ENCOUNTER
Patient called to cancel sleep study appointment of 4/9/18. He said he has 02 at home now and wants to try it out first and will call us back if he feels he needs to schedule the appointment.

## 2018-05-03 ENCOUNTER — TELEPHONE (OUTPATIENT)
Dept: SLEEP MEDICINE | Age: 68
End: 2018-05-03

## 2018-05-03 NOTE — TELEPHONE ENCOUNTER
Reviewed let from patient - clarified to him that he saw me in the context of sleep, of the need to do a sleep test and to consult with a pulmonologist if he experiences daytime breathing difficulties.

## 2018-05-03 NOTE — TELEPHONE ENCOUNTER
Received mail from patient. In the letter, addressed to Dr Robin Lu, he has explained is present health status and has concluded he did not need to do sleep study at this time. Letter scanned in chart. KAROI.

## 2018-05-14 ENCOUNTER — TELEPHONE (OUTPATIENT)
Dept: FAMILY MEDICINE CLINIC | Age: 68
End: 2018-05-14

## 2018-05-14 NOTE — TELEPHONE ENCOUNTER
Call was transferred from [facility] stating the patient wants an appointment with Dr. Peterson Or as he was referred by  Dr. Annabelle King of the [facility] [address]  [phone]    To be seen for arm pain. I spoke with patient and offered him appointments with  sports medicine physicians at this office, and the patient declined those appointments.     Please advise, patient #[phone]

## 2018-05-16 ENCOUNTER — TELEPHONE (OUTPATIENT)
Dept: FAMILY MEDICINE CLINIC | Age: 68
End: 2018-05-16

## 2018-05-16 NOTE — TELEPHONE ENCOUNTER
----- Message from Owen Giang sent at 5/15/2018 10:55 AM EDT -----  Regarding: Dr. Krishan Ledesma is requesting a call back regarding rescheduling his NP appt. Pt contact [phone].

## 2018-05-16 NOTE — TELEPHONE ENCOUNTER
Nurse to assist.  Thanks. Dr Merlyn See    Call patient Received:  Today       eKllie Sykes [facility] Front Office                     Pt requesting a call back from Lashawn Vargas knows his situation.  His number is [phone]. [facility] was NP for yesterday

## 2018-05-17 ENCOUNTER — OFFICE VISIT (OUTPATIENT)
Dept: FAMILY MEDICINE CLINIC | Age: 68
End: 2018-05-17

## 2018-05-17 VITALS
RESPIRATION RATE: 20 BRPM | HEIGHT: 69 IN | WEIGHT: 197 LBS | OXYGEN SATURATION: 99 % | HEART RATE: 61 BPM | TEMPERATURE: 98 F | DIASTOLIC BLOOD PRESSURE: 66 MMHG | SYSTOLIC BLOOD PRESSURE: 116 MMHG | BODY MASS INDEX: 29.18 KG/M2

## 2018-05-17 DIAGNOSIS — N40.1 NOCTURIA ASSOCIATED WITH BENIGN PROSTATIC HYPERPLASIA: ICD-10-CM

## 2018-05-17 DIAGNOSIS — R01.1 CARDIAC MURMUR: ICD-10-CM

## 2018-05-17 DIAGNOSIS — R53.83 FATIGUE, UNSPECIFIED TYPE: ICD-10-CM

## 2018-05-17 DIAGNOSIS — Z11.59 ENCOUNTER FOR HEPATITIS C SCREENING TEST FOR LOW RISK PATIENT: ICD-10-CM

## 2018-05-17 DIAGNOSIS — T50.1X5S: ICD-10-CM

## 2018-05-17 DIAGNOSIS — G47.9 SLEEP DIFFICULTIES: ICD-10-CM

## 2018-05-17 DIAGNOSIS — E11.9 TYPE 2 DIABETES MELLITUS WITHOUT COMPLICATION, UNSPECIFIED WHETHER LONG TERM INSULIN USE (HCC): ICD-10-CM

## 2018-05-17 DIAGNOSIS — R06.01 ORTHOPNEA: Primary | ICD-10-CM

## 2018-05-17 DIAGNOSIS — Z13.31 SCREENING FOR DEPRESSION: ICD-10-CM

## 2018-05-17 DIAGNOSIS — I35.0 NONRHEUMATIC AORTIC VALVE STENOSIS: ICD-10-CM

## 2018-05-17 DIAGNOSIS — R35.1 NOCTURIA ASSOCIATED WITH BENIGN PROSTATIC HYPERPLASIA: ICD-10-CM

## 2018-05-17 PROBLEM — Z82.3 FH: CVA (CEREBROVASCULAR ACCIDENT): Status: ACTIVE | Noted: 2018-05-17

## 2018-05-17 PROBLEM — E07.9 THYROID CONDITION: Status: ACTIVE | Noted: 2018-05-17

## 2018-05-17 LAB
ALBUMIN UR QL STRIP: 10 MG/L
CREATININE, URINE POC: 100 MG/DL
MICROALBUMIN/CREAT RATIO POC: <30 MG/G

## 2018-05-17 RX ORDER — METFORMIN HYDROCHLORIDE 500 MG/1
TABLET ORAL 2 TIMES DAILY WITH MEALS
COMMUNITY
End: 2018-05-17 | Stop reason: SDUPTHER

## 2018-05-17 RX ORDER — FUROSEMIDE 40 MG/1
20 TABLET ORAL DAILY
Qty: 90 TAB | Refills: 1 | Status: SHIPPED | OUTPATIENT
Start: 2018-05-17

## 2018-05-17 RX ORDER — METFORMIN HYDROCHLORIDE 500 MG/1
500 TABLET ORAL 2 TIMES DAILY WITH MEALS
Qty: 180 TAB | Refills: 1 | Status: SHIPPED | OUTPATIENT
Start: 2018-05-17 | End: 2018-08-27 | Stop reason: SDUPTHER

## 2018-05-17 NOTE — PATIENT INSTRUCTIONS
Labs soon    Obtain heart MRI    Obtain sleep evaluation #[phone]    Follow up with Dr. Caleb Romero    See Dr. Demi Cortez for lung evaluation    Please call Sharifa Ware to help arrange and authorize any tests and/or referrals.   Her # is [phone] at Arielle Mccall is #[phone]    [facility] - [facility] exam soon

## 2018-05-17 NOTE — LETTER
5/21/2018 1:05 PM 
 
Mr. Lawrence Mcburney [address] [place] [address] [address] Dear Lawrence Mcburney: 
 
Please find your most recent results below. Resulted Orders HEPATITIS C AB Result Value Ref Range Hep C Virus Ab <0.1 0.0 - 0.9 s/co ratio Comment:  
                                     Negative:     < 0.8 Indeterminate: 0.8 - 0.9 Positive:     > 0.9 The CDC recommends that a positive HCV antibody result 
 be followed up with a HCV Nucleic Acid Amplification 
 test ([id number]). Narrative Performed at:  [address]  [id number] : Tara Metcalf MD, Phone:  [phone] CBC W/O DIFF Result Value Ref Range WBC 7.4 3.4 - 10.8 x10E3/uL  
 RBC 5.16 4.14 - 5.80 x10E6/uL HGB 14.7 13.0 - 17.7 g/dL HCT 44.0 37.5 - 51.0 % MCV 85 79 - 97 fL  
 MCH 28.5 26.6 - 33.0 pg  
 MCHC 33.4 31.5 - 35.7 g/dL  
 RDW 14.3 12.3 - 15.4 % PLATELET 067 054 - 019 x10E3/uL Narrative Performed at:  [address]  [id number] : Tara Metcalf MD, Phone:  [phone] METABOLIC PANEL, COMPREHENSIVE Result Value Ref Range Glucose 132 (H) 65 - 99 mg/dL BUN 9 8 - 27 mg/dL Creatinine 0.72 (L) 0.76 - 1.27 mg/dL GFR est non-AA 95 >59 mL/min/1.73 GFR est  >59 mL/min/1.73  
 BUN/Creatinine ratio 13 10 - 24 Sodium 139 134 - 144 mmol/L Potassium 4.3 3.5 - 5.2 mmol/L Chloride 104 96 - 106 mmol/L  
 CO2 20 18 - 29 mmol/L Calcium 9.3 8.6 - 10.2 mg/dL Protein, total 6.7 6.0 - 8.5 g/dL Albumin 4.3 3.6 - 4.8 g/dL GLOBULIN, TOTAL 2.4 1.5 - 4.5 g/dL A-G Ratio 1.8 1.2 - 2.2 Bilirubin, total 0.5 0.0 - 1.2 mg/dL Alk. phosphatase 55 39 - 117 IU/L  
 AST (SGOT) 15 0 - 40 IU/L  
 ALT (SGPT) 16 0 - 44 IU/L Narrative Performed at:  [place] [address] [address]  [id number] : Tara Metcalf MD, Phone:  [phone] LDL, DIRECT Result Value Ref Range LDL,Direct 81 0 - 99 mg/dL Narrative Performed at:  [address]  [id number] : Tara Metcalf MD, Phone:  [phone] HEMOGLOBIN A1C WITH EAG Result Value Ref Range Hemoglobin A1c 8.9 (H) 4.8 - 5.6 % Comment:  
            Pre-diabetes: 5.7 - 6.4 Diabetes: >6.4 Glycemic control for adults with diabetes: <7.0 Estimated average glucose 209 mg/dL Narrative Performed at:  [address]  [id number] : Tara Metcalf MD, Phone:  [phone] AMB POC URINE, MICROALBUMIN, SEMIQUANT (3 RESULTS) Result Value Ref Range ALBUMIN, URINE POC 10 Negative mg/L  
 CREATININE, URINE  mg/dL Microalbumin/creat ratio (POC) <30 <30 MG/G  
TSH 3RD GENERATION Result Value Ref Range TSH 3.370 0.450 - 4.500 uIU/mL Narrative Performed at:  [address]  [id number] : Tara Metcalf MD, Phone:  [phone] T4, FREE Result Value Ref Range T4, Free 1.31 0.82 - 1.77 ng/dL Narrative Performed at:  [address]  [id number] : Tara Metcalf MD, Phone:  [phone] MAGNESIUM Result Value Ref Range Magnesium 2.1 1.6 - 2.3 mg/dL Narrative Performed at:  [address]  [id number] : Tara Metcalf MD, Phone:  [phone] PSA, DIAGNOSTIC (PROSTATE SPECIFIC AG) Result Value Ref Range Prostate Specific Ag 1.0 0.0 - 4.0 ng/mL Comment:  
   Roche ECLIA methodology.  
According to the American Urological Association, Serum PSA should 
decrease and remain at undetectable levels after radical 
 prostatectomy. The AUA defines biochemical recurrence as an initial 
PSA value 0.2 ng/mL or greater followed by a subsequent confirmatory PSA value 0.2 ng/mL or greater. Values obtained with different assay methods or kits cannot be used 
interchangeably. Results cannot be interpreted as absolute evidence 
of the presence or absence of malignant disease. Narrative Performed at:  [address]  [id number] : Daniela Moses MD, Phone:  [phone] CULTURE, URINE Result Value Ref Range Urine Culture, Routine No growth Narrative Performed at:  [address] [place] [address]  [id number] : Sarina Cameron MD, Phone:  [phone] RECOMMENDATIONS: 
 
Your diabetes control is slowly improving but still not optimal.  Please follow up to discuss. Sincerely, Bulmaro Porter MD

## 2018-05-17 NOTE — PROGRESS NOTES
Scott Jordan is a 70 y.o. male      Issues discussed today include:    New patient    Signs and symptoms:  orthopnea  Duration:  months  Context:  Positional dyspnea, often wakes him at night  Location:  Chest pain and dyspnea  Quality:  +AS on recent ECHO  Severity:  He feels like he is dying  Timing:  Daily sx  Modifying factors:  He saw cardiologist Dr. Yelena Becker  He had normal PFTs recently he says    Data reviewed or ordered today:  Labs (non fasting), cardiac MRI, sleep evaluaiton    Other problems include:  Patient Active Problem List   Diagnosis Code    Chest pain R07.9    SOB (shortness of breath) R06.02    Nonrheumatic aortic valve stenosis I35.0       Medications:  Current Outpatient Prescriptions   Medication Sig Dispense Refill    metFORMIN (GLUCOPHAGE) 500 mg tablet Take 1 Tab by mouth two (2) times daily (with meals). 180 Tab 1    furosemide (LASIX) 40 mg tablet Take 0.5 Tabs by mouth daily. 90 Tab 1    melatonin 5 mg cap capsule Take 5 mg by mouth nightly. Allergies:  No Known Allergies    LMP:  No LMP for male patient. Social History     Social History    Marital status:      Spouse name: N/A    Number of children: N/A    Years of education: N/A     Occupational History    Not on file. Social History Main Topics    Smoking status: Never Smoker    Smokeless tobacco: Never Used    Alcohol use No    Drug use: No    Sexual activity: Not on file     Other Topics Concern    Not on file     Social History Narrative         Family History   Problem Relation Age of Onset    Heart Disease Mother      chf         Meaningful use:  done      ROS:  Headaches:  occo  Chest Pain:  yes  SOB:  Yes during spells  Fevers:  no  Falls:  no  anxiety/depression/losing interest in doing things that were previously enjoyed:  no.   PHQ2 = 2, PHQ9 = 5, GAD7 = 1  Other significant ROS:  Cardiac murmur  Patient denied problems with:    Hearing/vision, speaking/swallowing, Reflux/indigestion, Cough,Diarrhea/constipation,Problems passing or controlling urine,  Mood (anxiety/depression/losing interest in doing things that were previously enjoyed), Fatigue, Weight change, memory                                                         Any other Positive ROS include: thirst, dry mouth/nose, tinnitis, nocturia    snoring    Falls in the past 12 months:  no           Over the last year (or since your last visit):  Have you been diagnosed with heart attack, stroke, broken bones, or skin cancer = no    Exercise:  ealks regularly             Smoking history:  none                                    No LMP for male patient. Physical Exam  Visit Vitals    /66 (BP 1 Location: Right arm, BP Patient Position: Sitting)    Pulse 61    Temp 98 °F (36.7 °C) (Oral)    Resp 20    Ht 5' 9\" (1.753 m)    Wt 197 lb (89.4 kg)    SpO2 99%    BMI 29.09 kg/m2     BP Readings from Last 3 Encounters:   05/17/18 116/66   04/02/18 129/64   03/29/18 138/78     Constitutional:  Appears well,  No Acute Distress, Vitals noted  Psychiatric:   Affect normal, Alert and cooperative, Oriented to person/place/time    Eyes:   Pupils equally round and reactive, EOMI, conjunctiva clear, eyelids normal  ENT:   External ears and nose normal/lips, teeth=OK/gums normal, TMs and Orophyarynx normal  Neck:   general inspection and Thyroid normal.  No abnormal cervical or supraclavicular nodes    Lungs:   clear to auscultation, good respiratory effort  Heart:  +ROYN radiating to neck, 3/6. Regular rhythm.     Chest wall normal  Abdominal exam:   normal.  Liver and spleen normal.  No bruits/masses/tenderness    Extremities:   without edema, good peripheral pulses  Skin:   Warm to palpation, without rashes, bruising, or suspicious lesions           Assessment:    Patient Active Problem List   Diagnosis Code    Chest pain R07.9    SOB (shortness of breath) R06.02    Nonrheumatic aortic valve stenosis I35.0       Today's diagnoses are: ICD-10-CM ICD-9-CM    1. Orthopnea R06.01 786.02 MRI CARDIAC FLOW W WO CONT      REFERRAL TO PULMONARY DISEASE      furosemide (LASIX) 40 mg tablet   2. Encounter for hepatitis C screening test for low risk patient Z11.59 V73.89 HEPATITIS C AB   3. Type 2 diabetes mellitus without complication, unspecified whether long term insulin use (HCC) E11.9 250.00 CBC W/O DIFF      METABOLIC PANEL, COMPREHENSIVE      LDL, DIRECT      HEMOGLOBIN A1C WITH EAG      AMB POC URINE, MICROALBUMIN, SEMIQUANT (3 RESULTS)      metFORMIN (GLUCOPHAGE) 500 mg tablet   4. Fatigue, unspecified type R53.83 780.79 TSH 3RD GENERATION      T4, FREE   5. Adverse effect of furosemide, sequela T50.1X5S 909.5 MAGNESIUM   6. Sleep difficulties G47.9 780.50 REFERRAL TO SLEEP STUDIES      REFERRAL TO PULMONARY DISEASE   7. Nonrheumatic aortic valve stenosis I35.0 424.1 MRI CARDIAC FLOW W WO CONT   8. Cardiac murmur R01.1 785.2     Dr. Mona Thornton   9. Screening for depression Z13.89 V79.0 [place] DEPRESSION SCREEN ANNUAL   10. Nocturia associated with benign prostatic hyperplasia N40.1 600.01 PSA, DIAGNOSTIC (PROSTATE SPECIFIC AG)    R35.1 788.43 CULTURE, URINE       Plan:  Orders Placed This Encounter    CULTURE, URINE    MRI CARDIAC FLOW W WO CONT     Standing Status:   Future     Standing Expiration Date:   6/17/2019     Order Specific Question:   Is Patient Allergic to Contrast Dye? Answer:   Unknown     Order Specific Question:   Reason for Exam     Answer:   orthopnea, AS     Order Specific Question:   STAT Creatinine as indicated     Answer:    Yes    HEPATITIS C AB    CBC W/O DIFF    METABOLIC PANEL, COMPREHENSIVE    LDL, DIRECT    HEMOGLOBIN A1C WITH EAG    TSH 3RD GENERATION    T4, FREE    MAGNESIUM    PROSTATE SPECIFIC AG    REFERRAL TO SLEEP STUDIES     Referral Priority:   Routine     Referral Type:   Consultation     Referral Reason:   Specialty Services Required     Referral Location:   [facility] Referred to Provider:   Pipo Yeung MD     Requested Specialty:   Sleep Medicine   [facility]     Referral Priority:   Routine     Referral Type:   Consultation     Referral Reason:   Specialty Services Required     Referral Location:   [facility] [place] [address].     Referred to Provider:   Rosio Montez MD    AMB POC URINE, MICROALBUMIN, SEMIQUANT (3 RESULTS)    [place] DEPRESSION SCREEN ANNUAL    DISCONTD: metFORMIN (GLUCOPHAGE) 500 mg tablet     Sig: Take  by mouth two (2) times daily (with meals).  metFORMIN (GLUCOPHAGE) 500 mg tablet     Sig: Take 1 Tab by mouth two (2) times daily (with meals). Dispense:  180 Tab     Refill:  1    furosemide (LASIX) 40 mg tablet     Sig: Take 0.5 Tabs by mouth daily. Dispense:  90 Tab     Refill:  1       See patient instructions  Patient Instructions   Labs soon    Obtain heart MRI    Obtain sleep evaluation #[phone]    Follow up with Dr. Valerie Winn    See Dr. Leatha Jhaveri for lung evaluation    Please call Yusra Coombs to help arrange and authorize any tests and/or referrals. Her # is [phone] at [facility] is #[phone]    [facility] exam soon        refresh note:  done    AVS Printed:  done    Diagnoses (need 4)          4 established problems OR new problem with work up?   YES    Data (need 4)         Ordering labs  =  YES         Ordering radiology  =  YES           Decision to obtain old records   =  YES         Reviewing/summarize old records  =  YES  (gets 2 points), see scan of records    Risk (High)           Acute or chronic illness with threat to life  =  YES      Need 2 of 3 above plus below    Level 5 history  (4HPI, 2 PFS, 10 ROS)  =  YES    Level 5 PE  =  YES    Level 5  ROS  =  YES

## 2018-05-18 ENCOUNTER — TELEPHONE (OUTPATIENT)
Dept: FAMILY MEDICINE CLINIC | Age: 68
End: 2018-05-18

## 2018-05-18 LAB
ALBUMIN SERPL-MCNC: 4.3 G/DL (ref 3.6–4.8)
ALBUMIN/GLOB SERPL: 1.8 {RATIO} (ref 1.2–2.2)
ALP SERPL-CCNC: 55 IU/L (ref 39–117)
ALT SERPL-CCNC: 16 IU/L (ref 0–44)
AST SERPL-CCNC: 15 IU/L (ref 0–40)
BACTERIA UR CULT: NO GROWTH
BILIRUB SERPL-MCNC: 0.5 MG/DL (ref 0–1.2)
BUN SERPL-MCNC: 9 MG/DL (ref 8–27)
BUN/CREAT SERPL: 13 (ref 10–24)
CALCIUM SERPL-MCNC: 9.3 MG/DL (ref 8.6–10.2)
CHLORIDE SERPL-SCNC: 104 MMOL/L (ref 96–106)
CO2 SERPL-SCNC: 20 MMOL/L (ref 18–29)
CREAT SERPL-MCNC: 0.72 MG/DL (ref 0.76–1.27)
ERYTHROCYTE [DISTWIDTH] IN BLOOD BY AUTOMATED COUNT: 14.3 % (ref 12.3–15.4)
EST. AVERAGE GLUCOSE BLD GHB EST-MCNC: 209 MG/DL
GFR SERPLBLD CREATININE-BSD FMLA CKD-EPI: 110 ML/MIN/1.73
GFR SERPLBLD CREATININE-BSD FMLA CKD-EPI: 95 ML/MIN/1.73
GLOBULIN SER CALC-MCNC: 2.4 G/DL (ref 1.5–4.5)
GLUCOSE SERPL-MCNC: 132 MG/DL (ref 65–99)
HBA1C MFR BLD: 8.9 % (ref 4.8–5.6)
HCT VFR BLD AUTO: 44 % (ref 37.5–51)
HCV AB S/CO SERPL IA: <0.1 S/CO RATIO (ref 0–0.9)
HGB BLD-MCNC: 14.7 G/DL (ref 13–17.7)
LDLC SERPL DIRECT ASSAY-MCNC: 81 MG/DL (ref 0–99)
MAGNESIUM SERPL-MCNC: 2.1 MG/DL (ref 1.6–2.3)
MCH RBC QN AUTO: 28.5 PG (ref 26.6–33)
MCHC RBC AUTO-ENTMCNC: 33.4 G/DL (ref 31.5–35.7)
MCV RBC AUTO: 85 FL (ref 79–97)
PLATELET # BLD AUTO: 263 X10E3/UL (ref 150–379)
POTASSIUM SERPL-SCNC: 4.3 MMOL/L (ref 3.5–5.2)
PROT SERPL-MCNC: 6.7 G/DL (ref 6–8.5)
PSA SERPL-MCNC: 1 NG/ML (ref 0–4)
RBC # BLD AUTO: 5.16 X10E6/UL (ref 4.14–5.8)
SODIUM SERPL-SCNC: 139 MMOL/L (ref 134–144)
T4 FREE SERPL-MCNC: 1.31 NG/DL (ref 0.82–1.77)
TSH SERPL DL<=0.005 MIU/L-ACNC: 3.37 UIU/ML (ref 0.45–4.5)
WBC # BLD AUTO: 7.4 X10E3/UL (ref 3.4–10.8)

## 2018-05-18 NOTE — TELEPHONE ENCOUNTER
----- Message from Estefani Knox sent at 5/18/2018  9:28 AM EDT -----  Regarding: Dr. Tomás Zamudio is requesting a call back from [facility] regarding his referral. Pt states his appt is at 10:15 today and verification is still needed. Pt contact [phone].

## 2018-05-18 NOTE — TELEPHONE ENCOUNTER
----- Message from Kristin Medina sent at 5/18/2018 11:13 AM EDT -----  Regarding: Dr. Shirin Larson  [facility] a [facility] for shortness of breath Dr. Jane Guthrie. [phone] ext [address] [place] [phone].

## 2018-05-31 ENCOUNTER — HOSPITAL ENCOUNTER (OUTPATIENT)
Dept: CT IMAGING | Age: 68
Discharge: HOME | End: 2018-05-31
Attending: INTERNAL MEDICINE
Payer: MEDICARE

## 2018-05-31 DIAGNOSIS — R06.02 SHORTNESS OF BREATH: ICD-10-CM

## 2018-05-31 PROCEDURE — 71250 CT THORAX DX C-: CPT

## 2018-06-18 DIAGNOSIS — R06.00 DYSPNEA, UNSPECIFIED TYPE: Primary | ICD-10-CM

## 2018-08-27 DIAGNOSIS — E11.9 TYPE 2 DIABETES MELLITUS WITHOUT COMPLICATION, UNSPECIFIED WHETHER LONG TERM INSULIN USE (HCC): ICD-10-CM

## 2018-08-27 RX ORDER — METFORMIN HYDROCHLORIDE 500 MG/1
500 TABLET ORAL 2 TIMES DAILY WITH MEALS
Qty: 180 TAB | Refills: 1 | Status: SHIPPED | OUTPATIENT
Start: 2018-08-27

## 2018-08-27 RX ORDER — ROSUVASTATIN CALCIUM 5 MG/1
5 TABLET, COATED ORAL
Qty: 90 TAB | Refills: 3 | Status: SHIPPED | OUTPATIENT
Start: 2018-08-27

## 2022-03-18 PROBLEM — R06.02 SOB (SHORTNESS OF BREATH): Status: ACTIVE | Noted: 2018-03-14

## 2022-03-19 PROBLEM — E11.9 TYPE 2 DIABETES MELLITUS WITHOUT COMPLICATION, WITHOUT LONG-TERM CURRENT USE OF INSULIN (HCC): Status: ACTIVE | Noted: 2018-05-17

## 2022-03-19 PROBLEM — R07.9 CHEST PAIN: Status: ACTIVE | Noted: 2018-03-14

## 2022-03-19 PROBLEM — I35.0 NONRHEUMATIC AORTIC VALVE STENOSIS: Status: ACTIVE | Noted: 2018-03-14

## 2022-03-19 PROBLEM — Z82.3 FH: CVA (CEREBROVASCULAR ACCIDENT): Status: ACTIVE | Noted: 2018-05-17

## 2022-03-20 PROBLEM — E07.9 THYROID CONDITION: Status: ACTIVE | Noted: 2018-05-17

## 2024-11-10 NOTE — MR AVS SNAPSHOT
[address] [place] [phone] Patient: Joe Nunes MRN: [id number] FRANKO:[id number] Visit Information Date & Time Provider Department Dept. Phone Encounter #  
 5/17/2018 11:20 AM Felicita Arzate MD [address] [facility] [phone] [id number] Upcoming Health Maintenance Date Due Hepatitis C Screening 1950 DTaP/Tdap/Td series (1 - Tdap) 2/20/1970 FOBT Q 1 YEAR AGE 50-75 2/20/1999 ZOSTER VACCINE AGE 60> 12/20/2008 GLAUCOMA SCREENING Q2Y 2/20/2014 Pneumococcal 65+ Low/Medium Risk (1 of 2 - PCV13) 2/20/2014 MEDICARE YEARLY EXAM 3/14/2018 Influenza Age 5 to Adult 8/1/2018 Allergies as of 5/17/2018  Review Complete On: 5/17/2018 By: Julissa Helms LPN No Known Allergies Current Immunizations  Never Reviewed No immunizations on file. Not reviewed this visit You Were Diagnosed With   
  
 Codes Comments Orthopnea    -  Primary ICD-10-CM: R06.01 
ICD-9-CM: 786.02 Encounter for hepatitis C screening test for low risk patient     ICD-10-CM: Z11.59 
ICD-9-CM: V73.89 Type 2 diabetes mellitus without complication, unspecified whether long term insulin use (HCC)     ICD-10-CM: E11.9 ICD-9-CM: 250.00 Fatigue, unspecified type     ICD-10-CM: R53.83 ICD-9-CM: 780.79 Adverse effect of furosemide, sequela     ICD-10-CM: T50.1X5S 
ICD-9-CM: 909.5 Sleep difficulties     ICD-10-CM: G47.9 ICD-9-CM: 780.50 Nonrheumatic aortic valve stenosis     ICD-10-CM: I35.0 ICD-9-CM: 424.1 Cardiac murmur     ICD-10-CM: R01.1 ICD-9-CM: 785.2 Dr. Beatriz Zacarias Screening for depression     ICD-10-CM: Z13.89 ICD-9-CM: V79.0 Vitals BP Pulse Temp Resp Height(growth percentile) Weight(growth percentile) 116/66 (BP 1 Location: Right arm, BP Patient Position: Sitting) 61 98 °F (36.7 °C) (Oral) 20 5' 9\" (1.753 m) 197 lb (89.4 kg) Labs are normal except your fats are elevated at 204, should be less than 150. Vitamin B12 is 391, would like to see this above 700. I want you to take vitamin B12 1000 mcg over-the-counter daily. Pregnancy testing is all negative.  SpO2 BMI Smoking Status 99% 29.09 kg/m2 Never Smoker Vitals History BMI and BSA Data Body Mass Index Body Surface Area  
 29.09 kg/m 2 2.09 m 2 Preferred Pharmacy Pharmacy Name Phone [facility] [address] [place] [address] Drive, [address] [phone] Your Updated Medication List  
  
   
This list is accurate as of 5/17/18 12:14 PM.  Always use your most recent med list.  
  
  
  
  
 LASIX 40 mg tablet Generic drug:  furosemide Take  by mouth daily. melatonin 5 mg Cap capsule Take 5 mg by mouth nightly. metFORMIN 500 mg tablet Commonly known as:  GLUCOPHAGE Take  by mouth two (2) times daily (with meals). We Performed the Following AMB POC URINE, MICROALBUMIN, SEMIQUANT (3 RESULTS) [20211 CPT(R)] CBC W/O DIFF [37776 CPT(R)] HEMOGLOBIN A1C WITH EAG [39652 CPT(R)] HEPATITIS C AB [22067 CPT(R)] LDL, DIRECT [id number] CPT(R)] MAGNESIUM [id number] CPT(R)] METABOLIC PANEL, COMPREHENSIVE [09266 CPT(R)] [address] [facility] [ [facility]] REFERRAL TO PULMONARY DISEASE [LZB04 Custom] REFERRAL TO SLEEP STUDIES [REF99 Custom] T4, FREE A7065129 CPT(R)] TSH 3RD GENERATION [13338 CPT(R)] To-Do List   
 05/17/2018 Imaging:  MRI CARDIAC FLOW W WO CONT Referral Information Referral ID Referred By Referred To  
  
 [id number] Edna Scott MD   
   [address] [place] [address] Phone: [phone] Fax: [phone] Visits Status Start Date End Date 1 New Request 5/17/18 5/17/19 If your referral has a status of pending review or denied, additional information will be sent to support the outcome of this decision. Referral ID Referred By Referred To  
 [id number] Vicki Liu [facility].  
   [address] [place] [address] Visits Status Start Date End Date 1 New Request 5/17/18 5/17/19 If your referral has a status of pending review or denied, additional information will be sent to support the outcome of this decision. Patient Instructions Labs soon Obtain heart MRI Obtain sleep evaluation #[phone] Follow up with Dr. Mona Thornton See Dr. Harvey Dahl for lung evaluation Please call [employer] to help arrange and authorize any tests and/or referrals. Her # is [phone] Central Scheduling at [facility] is #[phone] WELLNESS exam soon Introducing [facility]! [facility] introduces farmhopping patient portal. Now you can access parts of your medical record, email your doctor's office, and request medication refills online. 1. In your internet browser, go to [email or web address] (acquired by [employer])/Bazarit 2. Click on the First Time User? Click Here link in the Sign In box. You will see the New Member Sign Up page. 3. Enter your farm[employer]pping Access Code exactly as it appears below. You will not need to use this code after youve completed the sign-up process. If you do not sign up before the expiration date, you must request a new code. · [employer]pping Access Code: [email or web address] Expires: 5/20/2018  3:15 PM 
 
4. Enter the last four digits of your Social Security Number (xxxx) and Date of Birth (mm/dd/yyyy) as indicated and click Submit. You will be taken to the next sign-up page. 5. Create a farmhopping ID. This will be your [employer]pping login ID and cannot be changed, so think of one that is secure and easy to remember. 6. Create a [employer]pping password. You can change your password at any time. 7. Enter your Password Reset Question and Answer. This can be used at a later time if you forget your password. 8. Enter your e-mail address. You will receive e-mail notification when new information is available in [address] 9. Click Sign Up. You can now view and download portions of your medical record. 10. Click the Download Summary menu link to download a portable copy of your medical information. If you have questions, please visit the Frequently Asked Questions section of the [employer] website. Remember, [employer] is NOT to be used for urgent needs. For medical emergencies, dial 911. Now available from your iPhone and Android! Please provide this summary of care documentation to your next provider. Your primary care clinician is listed as January Gutierrez. If you have any questions after today's visit, please call [phone].

## 2025-06-27 ENCOUNTER — HOSPITAL ENCOUNTER (EMERGENCY)
Facility: HOSPITAL | Age: 75
Discharge: HOME | End: 2025-06-27
Attending: EMERGENCY MEDICINE
Payer: MEDICARE

## 2025-06-27 ENCOUNTER — APPOINTMENT (OUTPATIENT)
Facility: HOSPITAL | Age: 75
End: 2025-06-27
Payer: MEDICARE

## 2025-06-27 VITALS
DIASTOLIC BLOOD PRESSURE: 77 MMHG | HEART RATE: 62 BPM | SYSTOLIC BLOOD PRESSURE: 158 MMHG | BODY MASS INDEX: 26.55 KG/M2 | HEIGHT: 69 IN | RESPIRATION RATE: 12 BRPM | WEIGHT: 179.23 LBS | OXYGEN SATURATION: 99 % | TEMPERATURE: 98.1 F

## 2025-06-27 DIAGNOSIS — R11.2 NAUSEA AND VOMITING IN ADULT: ICD-10-CM

## 2025-06-27 DIAGNOSIS — N20.0 KIDNEY STONE: Primary | ICD-10-CM

## 2025-06-27 LAB
ALBUMIN SERPL-MCNC: 3.8 G/DL (ref 3.5–5)
ALBUMIN/GLOB SERPL: 1.2 (ref 1.1–2.2)
ALP SERPL-CCNC: 57 U/L (ref 45–117)
ALT SERPL-CCNC: 17 U/L (ref 12–78)
ANION GAP SERPL CALC-SCNC: 8 MMOL/L (ref 2–12)
APPEARANCE UR: CLEAR
AST SERPL-CCNC: 14 U/L (ref 15–37)
BACTERIA URNS QL MICRO: NEGATIVE /HPF
BASOPHILS # BLD: 0.04 K/UL (ref 0–0.1)
BASOPHILS NFR BLD: 0.3 % (ref 0–1)
BILIRUB SERPL-MCNC: 1.2 MG/DL (ref 0.2–1)
BILIRUB UR QL: NEGATIVE
BUN SERPL-MCNC: 16 MG/DL (ref 6–20)
BUN/CREAT SERPL: 12 (ref 12–20)
CALCIUM SERPL-MCNC: 9.1 MG/DL (ref 8.5–10.1)
CHLORIDE SERPL-SCNC: 104 MMOL/L (ref 97–108)
CO2 SERPL-SCNC: 24 MMOL/L (ref 21–32)
COLOR UR: ABNORMAL
CREAT SERPL-MCNC: 1.33 MG/DL (ref 0.7–1.3)
DIFFERENTIAL METHOD BLD: ABNORMAL
EOSINOPHIL # BLD: 0.01 K/UL (ref 0–0.4)
EOSINOPHIL NFR BLD: 0.1 % (ref 0–7)
EPITH CASTS URNS QL MICRO: ABNORMAL /LPF
ERYTHROCYTE [DISTWIDTH] IN BLOOD BY AUTOMATED COUNT: 13.8 % (ref 11.5–14.5)
GLOBULIN SER CALC-MCNC: 3.3 G/DL (ref 2–4)
GLUCOSE SERPL-MCNC: 183 MG/DL (ref 65–100)
GLUCOSE UR STRIP.AUTO-MCNC: >1000 MG/DL
HCT VFR BLD AUTO: 41.2 % (ref 36.6–50.3)
HGB BLD-MCNC: 14 G/DL (ref 12.1–17)
HGB UR QL STRIP: NEGATIVE
HYALINE CASTS URNS QL MICRO: ABNORMAL /LPF (ref 0–2)
IMM GRANULOCYTES # BLD AUTO: 0.04 K/UL (ref 0–0.04)
IMM GRANULOCYTES NFR BLD AUTO: 0.3 % (ref 0–0.5)
KETONES UR QL STRIP.AUTO: 15 MG/DL
LEUKOCYTE ESTERASE UR QL STRIP.AUTO: NEGATIVE
LIPASE SERPL-CCNC: 15 U/L (ref 13–75)
LYMPHOCYTES # BLD: 0.69 K/UL (ref 0.8–3.5)
LYMPHOCYTES NFR BLD: 5 % (ref 12–49)
MCH RBC QN AUTO: 29.5 PG (ref 26–34)
MCHC RBC AUTO-ENTMCNC: 34 G/DL (ref 30–36.5)
MCV RBC AUTO: 86.9 FL (ref 80–99)
MONOCYTES # BLD: 1.02 K/UL (ref 0–1)
MONOCYTES NFR BLD: 7.4 % (ref 5–13)
NEUTS SEG # BLD: 11.99 K/UL (ref 1.8–8)
NEUTS SEG NFR BLD: 86.9 % (ref 32–75)
NITRITE UR QL STRIP.AUTO: NEGATIVE
NRBC # BLD: 0 K/UL (ref 0–0.01)
NRBC BLD-RTO: 0 PER 100 WBC
PH UR STRIP: 6.5 (ref 5–8)
PLATELET # BLD AUTO: 225 K/UL (ref 150–400)
PMV BLD AUTO: 10.2 FL (ref 8.9–12.9)
POTASSIUM SERPL-SCNC: 3.9 MMOL/L (ref 3.5–5.1)
PROT SERPL-MCNC: 7.1 G/DL (ref 6.4–8.2)
PROT UR STRIP-MCNC: NEGATIVE MG/DL
RBC # BLD AUTO: 4.74 M/UL (ref 4.1–5.7)
RBC #/AREA URNS HPF: ABNORMAL /HPF (ref 0–5)
RBC MORPH BLD: ABNORMAL
SODIUM SERPL-SCNC: 136 MMOL/L (ref 136–145)
SP GR UR REFRACTOMETRY: 1.03 (ref 1–1.03)
UROBILINOGEN UR QL STRIP.AUTO: 0.2 EU/DL (ref 0.2–1)
WBC # BLD AUTO: 13.8 K/UL (ref 4.1–11.1)
WBC URNS QL MICRO: ABNORMAL /HPF (ref 0–4)

## 2025-06-27 PROCEDURE — 2580000003 HC RX 258: Performed by: EMERGENCY MEDICINE

## 2025-06-27 PROCEDURE — 99285 EMERGENCY DEPT VISIT HI MDM: CPT

## 2025-06-27 PROCEDURE — 83690 ASSAY OF LIPASE: CPT

## 2025-06-27 PROCEDURE — 6360000004 HC RX CONTRAST MEDICATION: Performed by: EMERGENCY MEDICINE

## 2025-06-27 PROCEDURE — 74177 CT ABD & PELVIS W/CONTRAST: CPT

## 2025-06-27 PROCEDURE — 36415 COLL VENOUS BLD VENIPUNCTURE: CPT

## 2025-06-27 PROCEDURE — 85025 COMPLETE CBC W/AUTO DIFF WBC: CPT

## 2025-06-27 PROCEDURE — 80053 COMPREHEN METABOLIC PANEL: CPT

## 2025-06-27 PROCEDURE — 81001 URINALYSIS AUTO W/SCOPE: CPT

## 2025-06-27 RX ORDER — KETOROLAC TROMETHAMINE 10 MG/1
10 TABLET, FILM COATED ORAL EVERY 6 HOURS PRN
Qty: 20 TABLET | Refills: 0 | Status: SHIPPED | OUTPATIENT
Start: 2025-06-27

## 2025-06-27 RX ORDER — ONDANSETRON 4 MG/1
4 TABLET, ORALLY DISINTEGRATING ORAL 3 TIMES DAILY PRN
Qty: 21 TABLET | Refills: 0 | Status: SHIPPED | OUTPATIENT
Start: 2025-06-27

## 2025-06-27 RX ORDER — HYDROCODONE BITARTRATE AND ACETAMINOPHEN 5; 325 MG/1; MG/1
1 TABLET ORAL EVERY 6 HOURS PRN
Qty: 12 TABLET | Refills: 0 | Status: SHIPPED | OUTPATIENT
Start: 2025-06-27 | End: 2025-06-30

## 2025-06-27 RX ORDER — KETOROLAC TROMETHAMINE 15 MG/ML
15 INJECTION, SOLUTION INTRAMUSCULAR; INTRAVENOUS
Status: DISCONTINUED | OUTPATIENT
Start: 2025-06-27 | End: 2025-06-27 | Stop reason: HOSPADM

## 2025-06-27 RX ORDER — 0.9 % SODIUM CHLORIDE 0.9 %
1000 INTRAVENOUS SOLUTION INTRAVENOUS ONCE
Status: COMPLETED | OUTPATIENT
Start: 2025-06-27 | End: 2025-06-27

## 2025-06-27 RX ORDER — TAMSULOSIN HYDROCHLORIDE 0.4 MG/1
0.4 CAPSULE ORAL DAILY
Qty: 10 CAPSULE | Refills: 0 | Status: SHIPPED | OUTPATIENT
Start: 2025-06-27 | End: 2025-07-07

## 2025-06-27 RX ORDER — IOPAMIDOL 755 MG/ML
100 INJECTION, SOLUTION INTRAVASCULAR
Status: COMPLETED | OUTPATIENT
Start: 2025-06-27 | End: 2025-06-27

## 2025-06-27 RX ORDER — ONDANSETRON 2 MG/ML
4 INJECTION INTRAMUSCULAR; INTRAVENOUS
Status: DISCONTINUED | OUTPATIENT
Start: 2025-06-27 | End: 2025-06-27 | Stop reason: HOSPADM

## 2025-06-27 RX ADMIN — IOPAMIDOL 100 ML: 755 INJECTION, SOLUTION INTRAVENOUS at 14:42

## 2025-06-27 RX ADMIN — SODIUM CHLORIDE 1000 ML: 0.9 INJECTION, SOLUTION INTRAVENOUS at 14:36

## 2025-06-27 ASSESSMENT — PAIN SCALES - GENERAL: PAINLEVEL_OUTOF10: 6

## 2025-06-27 ASSESSMENT — PAIN DESCRIPTION - ORIENTATION: ORIENTATION: LEFT

## 2025-06-27 ASSESSMENT — PAIN DESCRIPTION - LOCATION: LOCATION: ABDOMEN

## 2025-06-27 ASSESSMENT — PAIN - FUNCTIONAL ASSESSMENT
PAIN_FUNCTIONAL_ASSESSMENT: 0-10
PAIN_FUNCTIONAL_ASSESSMENT: NONE - DENIES PAIN

## 2025-06-27 NOTE — ED TRIAGE NOTES
Pt arrives to the ED with complaints of abdominal pain that started on 6/25, pt reports he was seen by his pcp where they did bloodwork and it showed an elevated WBC of 14. Pt reports the pain has shifted to the left side of his abdomen, states the pain gets worse when he sits down. Pt denies diarrhea, reports some nausea and one episode of vomiting.

## 2025-06-27 NOTE — ED PROVIDER NOTES
[facility] EMERGENCY DEPARTMENT  EMERGENCY DEPARTMENT ENCOUNTER      Pt Name: Clifton Moreno  MRN: [id number]  Birthdate 1950  Date of evaluation: 6/27/2025  Provider: Tye Goldstein DO      HISTORY OF PRESENT ILLNESS      HPI        Nursing Notes were reviewed.    REVIEW OF SYSTEMS         Review of Systems        PAST MEDICAL HISTORY   No past medical history on file.      SURGICAL HISTORY     No past surgical history on file.      CURRENT MEDICATIONS       Previous Medications    No medications on file       ALLERGIES     Patient has no allergy information on record.    FAMILY HISTORY     No family history on file.       SOCIAL HISTORY       Social History     Socioeconomic History   • Marital status:          PHYSICAL EXAM       ED Triage Vitals   BP Systolic BP Percentile Diastolic BP Percentile Temp Temp src Pulse Resp SpO2   -- -- -- -- -- -- -- --      Height Weight         -- --             There is no height or weight on file to calculate BMI.    Physical Exam        EMERGENCY DEPARTMENT COURSE and DIFFERENTIAL DIAGNOSIS/MDM:   Vitals:  There were no vitals filed for this visit.      Medical Decision Making  Amount and/or Complexity of Data Reviewed  Labs: ordered.  Radiology: ordered.    Risk  Prescription drug management.            REASSESSMENT          CONSULTS:  None    PROCEDURES:     Procedures      ***      (Please note that portions of this note were completed with a voice recognition program.  Efforts were made to edit the dictations but occasionally words are mis-transcribed.)    Tye Goldstein DO (electronically signed)  Emergency Attending Physician